# Patient Record
Sex: FEMALE | Race: BLACK OR AFRICAN AMERICAN | NOT HISPANIC OR LATINO
[De-identification: names, ages, dates, MRNs, and addresses within clinical notes are randomized per-mention and may not be internally consistent; named-entity substitution may affect disease eponyms.]

---

## 2018-03-29 PROCEDURE — 81003 URINALYSIS AUTO W/O SCOPE: CPT | Performed by: INTERNAL MEDICINE

## 2018-04-26 PROBLEM — E66.01 OBESITY, MORBID, BMI 40.0-49.9 (HCC): Status: ACTIVE | Noted: 2018-04-26

## 2018-04-26 PROCEDURE — 87624 HPV HI-RISK TYP POOLED RSLT: CPT | Performed by: INTERNAL MEDICINE

## 2018-04-26 PROCEDURE — 88175 CYTOPATH C/V AUTO FLUID REDO: CPT | Performed by: INTERNAL MEDICINE

## 2018-04-26 PROCEDURE — 87625 HPV TYPES 16 & 18 ONLY: CPT | Performed by: INTERNAL MEDICINE

## 2019-11-18 ENCOUNTER — TELEPHONE (OUTPATIENT)
Dept: SCHEDULING | Age: 49
End: 2019-11-18

## 2024-03-11 ENCOUNTER — ORDER TRANSCRIPTION (OUTPATIENT)
Dept: ADMINISTRATIVE | Facility: HOSPITAL | Age: 54
End: 2024-03-11

## 2024-03-11 DIAGNOSIS — E04.1 THYROID NODULE: Primary | ICD-10-CM

## 2024-03-26 ENCOUNTER — HOSPITAL ENCOUNTER (OUTPATIENT)
Dept: ULTRASOUND IMAGING | Facility: HOSPITAL | Age: 54
Discharge: HOME | End: 2024-03-26
Attending: OTOLARYNGOLOGY
Payer: COMMERCIAL

## 2024-03-26 DIAGNOSIS — E04.1 THYROID NODULE: ICD-10-CM

## 2024-03-26 PROCEDURE — 88173 CYTOPATH EVAL FNA REPORT: CPT | Performed by: OTOLARYNGOLOGY

## 2024-03-26 PROCEDURE — 10005 FNA BX W/US GDN 1ST LES: CPT | Performed by: OTOLARYNGOLOGY

## 2024-05-15 ENCOUNTER — TELEPHONE (OUTPATIENT)
Dept: CT IMAGING | Facility: HOSPITAL | Age: 54
End: 2024-05-15

## 2024-05-15 NOTE — TELEPHONE ENCOUNTER
RN received a call from ELIZABET Saleh at Dr. Salas's office ([facility]) regarding patient's thyroid bx completed by Dr. Quintero on 3/26/24. ELIZABET Saleh wants an explanation as to why the left lobe nodule was not biopsied in addition to the right lobe nodule \"as their order states\". RN had difficulty finding this order in [facility] as it was a [facility] order. RN consulted Kalani GRACIA from [place]. Plan to bring this to the attention of [place] lead in the morning.     ELIZABET Saleh asked staff to contact her back at her direct line [phone].      
warm